# Patient Record
Sex: MALE | Race: BLACK OR AFRICAN AMERICAN | Employment: UNEMPLOYED | URBAN - METROPOLITAN AREA
[De-identification: names, ages, dates, MRNs, and addresses within clinical notes are randomized per-mention and may not be internally consistent; named-entity substitution may affect disease eponyms.]

---

## 2019-08-07 ENCOUNTER — ANESTHESIA EVENT (OUTPATIENT)
Dept: ENDOSCOPY | Age: 64
End: 2019-08-07
Payer: MEDICAID

## 2019-08-07 RX ORDER — ASPIRIN 81 MG/1
81 TABLET ORAL DAILY
COMMUNITY

## 2019-08-07 RX ORDER — AMLODIPINE BESYLATE 5 MG/1
5 TABLET ORAL DAILY
COMMUNITY

## 2019-08-07 NOTE — PERIOP NOTES
PAT - SURGICAL PRE-ADMISSION INSTRUCTIONS    NAME:  Sajan Mcnulty                                                          TODAY'S DATE:  8/7/2019    SURGERY DATE:  8/8/2019                                  SURGERY ARRIVAL TIME:   1300    1. Do NOT eat or drink anything, including candy or gum, after MIDNIGHT on 8/7/19 , unless you have specific instructions from your Surgeon or Anesthesia Provider to do so. 2. No smoking on the day of surgery. 3. No alcohol 24 hours prior to the day of surgery. 4. No recreational drugs for one week prior to the day of surgery. 5. Leave all valuables, including money/purse, at home. 6. Remove all jewelry, nail polish, makeup (including mascara); no lotions, powders, deodorant, or perfume/cologne/after shave. 7. Glasses/Contact lenses and Dentures may be worn to the hospital.  They will be removed prior to surgery. 8. Call your doctor if symptoms of a cold or illness develop within 24 ours prior to surgery. 9. AN ADULT MUST DRIVE YOU HOME AFTER OUTPATIENT SURGERY. 10. If you are having an OUTPATIENT procedure, please make arrangements for a responsible adult to be with you for 24 hours after your surgery. 11. If you are admitted to the hospital, you will be assigned to a bed after surgery is complete. Normally a family member will not be able to see you until you are in your assigned bed. 15. Family is encouraged to accompany you to the hospital.  We ask visitors in the treatment area to be limited to ONE person at a time to ensure patient privacy. EXCEPTIONS WILL BE MADE AS NEEDED. 15. Children under 12 are discouraged from entering the treatment area and need to be supervised by an adult when in the waiting room. Special Instructions:     Take these medications the morning of surgery with a sip of water:  amlodipine, Complete bowel prep per MD instructions., STOP anticoagulants AT LEAST 1 WEEK PRIOR to your surgery or, follow other MD instructions: aspirin    Patient Prep:    shower with anti-bacterial soap    These surgical instructions were reviewed with patient during the PAT phone interview    Directions: On the morning of surgery, please go to the [address] [facility]. Enter the building from the [facility] entrance, 1st floor (next to the Emergency Room entrance). Take the elevator to the 2nd floor. Sign in at the Registration Desk.     If you have any questions and/or concerns, please do not hesitate to call:  (Prior to the day of surgery)  [facility] unit:  [phone]  (Day of surgery)  [facility] unit:  [phone]

## 2019-08-08 ENCOUNTER — HOSPITAL ENCOUNTER (OUTPATIENT)
Age: 64
Setting detail: OUTPATIENT SURGERY
Discharge: HOME | End: 2019-08-08
Attending: INTERNAL MEDICINE | Admitting: INTERNAL MEDICINE
Payer: MEDICAID

## 2019-08-08 ENCOUNTER — ANESTHESIA (OUTPATIENT)
Dept: ENDOSCOPY | Age: 64
End: 2019-08-08
Payer: MEDICAID

## 2019-08-08 VITALS
SYSTOLIC BLOOD PRESSURE: 180 MMHG | HEIGHT: 73 IN | RESPIRATION RATE: 14 BRPM | HEART RATE: 61 BPM | BODY MASS INDEX: 24.05 KG/M2 | WEIGHT: 181.44 LBS | TEMPERATURE: 98 F | OXYGEN SATURATION: 100 % | DIASTOLIC BLOOD PRESSURE: 86 MMHG

## 2019-08-08 PROBLEM — Z86.010 ENCOUNTER FOR COLONOSCOPY DUE TO HISTORY OF ADENOMATOUS COLONIC POLYPS: Status: ACTIVE | Noted: 2019-08-08

## 2019-08-08 PROBLEM — Z12.11 ENCOUNTER FOR COLONOSCOPY DUE TO HISTORY OF ADENOMATOUS COLONIC POLYPS: Status: ACTIVE | Noted: 2019-08-08

## 2019-08-08 PROCEDURE — 88305 TISSUE EXAM BY PATHOLOGIST: CPT

## 2019-08-08 PROCEDURE — 77030038604 HC SNR ENDO EXACTO USEN -B: Performed by: INTERNAL MEDICINE

## 2019-08-08 PROCEDURE — 74011250636 HC RX REV CODE- 250/636

## 2019-08-08 PROCEDURE — 74011250636 HC RX REV CODE- 250/636: Performed by: NURSE ANESTHETIST, CERTIFIED REGISTERED

## 2019-08-08 PROCEDURE — 77030021593 HC FCPS BIOP ENDOSC BSC -A: Performed by: INTERNAL MEDICINE

## 2019-08-08 PROCEDURE — 76060000032 HC ANESTHESIA 0.5 TO 1 HR: Performed by: INTERNAL MEDICINE

## 2019-08-08 PROCEDURE — 76040000007: Performed by: INTERNAL MEDICINE

## 2019-08-08 RX ORDER — SODIUM CHLORIDE 0.9 % (FLUSH) 0.9 %
5-40 SYRINGE (ML) INJECTION AS NEEDED
Status: DISCONTINUED | OUTPATIENT
Start: 2019-08-08 | End: 2019-08-08 | Stop reason: HOSPADM

## 2019-08-08 RX ORDER — LIDOCAINE HYDROCHLORIDE 10 MG/ML
0.1 INJECTION, SOLUTION EPIDURAL; INFILTRATION; INTRACAUDAL; PERINEURAL AS NEEDED
Status: DISCONTINUED | OUTPATIENT
Start: 2019-08-08 | End: 2019-08-08 | Stop reason: HOSPADM

## 2019-08-08 RX ORDER — SODIUM CHLORIDE, SODIUM LACTATE, POTASSIUM CHLORIDE, CALCIUM CHLORIDE 600; 310; 30; 20 MG/100ML; MG/100ML; MG/100ML; MG/100ML
25 INJECTION, SOLUTION INTRAVENOUS CONTINUOUS
Status: DISCONTINUED | OUTPATIENT
Start: 2019-08-08 | End: 2019-08-08 | Stop reason: HOSPADM

## 2019-08-08 RX ORDER — SODIUM CHLORIDE 0.9 % (FLUSH) 0.9 %
5-40 SYRINGE (ML) INJECTION EVERY 8 HOURS
Status: DISCONTINUED | OUTPATIENT
Start: 2019-08-08 | End: 2019-08-08 | Stop reason: HOSPADM

## 2019-08-08 RX ORDER — PROPOFOL 10 MG/ML
INJECTION, EMULSION INTRAVENOUS AS NEEDED
Status: DISCONTINUED | OUTPATIENT
Start: 2019-08-08 | End: 2019-08-08 | Stop reason: HOSPADM

## 2019-08-08 RX ORDER — SODIUM CHLORIDE 9 MG/ML
25 INJECTION, SOLUTION INTRAVENOUS CONTINUOUS
Status: DISCONTINUED | OUTPATIENT
Start: 2019-08-08 | End: 2019-08-08 | Stop reason: HOSPADM

## 2019-08-08 RX ORDER — DEXTROMETHORPHAN/PSEUDOEPHED 2.5-7.5/.8
1.2 DROPS ORAL
Status: DISCONTINUED | OUTPATIENT
Start: 2019-08-08 | End: 2019-08-08 | Stop reason: HOSPADM

## 2019-08-08 RX ORDER — LIDOCAINE HYDROCHLORIDE 20 MG/ML
INJECTION, SOLUTION EPIDURAL; INFILTRATION; INTRACAUDAL; PERINEURAL AS NEEDED
Status: DISCONTINUED | OUTPATIENT
Start: 2019-08-08 | End: 2019-08-08 | Stop reason: HOSPADM

## 2019-08-08 RX ADMIN — PROPOFOL 10 MG: 10 INJECTION, EMULSION INTRAVENOUS at 15:48

## 2019-08-08 RX ADMIN — PROPOFOL 10 MG: 10 INJECTION, EMULSION INTRAVENOUS at 15:38

## 2019-08-08 RX ADMIN — PROPOFOL 20 MG: 10 INJECTION, EMULSION INTRAVENOUS at 15:29

## 2019-08-08 RX ADMIN — SODIUM CHLORIDE, SODIUM LACTATE, POTASSIUM CHLORIDE, AND CALCIUM CHLORIDE 25 ML/HR: 600; 310; 30; 20 INJECTION, SOLUTION INTRAVENOUS at 13:54

## 2019-08-08 RX ADMIN — PROPOFOL 10 MG: 10 INJECTION, EMULSION INTRAVENOUS at 16:00

## 2019-08-08 RX ADMIN — PROPOFOL 10 MG: 10 INJECTION, EMULSION INTRAVENOUS at 15:57

## 2019-08-08 RX ADMIN — PROPOFOL 30 MG: 10 INJECTION, EMULSION INTRAVENOUS at 15:41

## 2019-08-08 RX ADMIN — PROPOFOL 10 MG: 10 INJECTION, EMULSION INTRAVENOUS at 15:55

## 2019-08-08 RX ADMIN — PROPOFOL 10 MG: 10 INJECTION, EMULSION INTRAVENOUS at 16:02

## 2019-08-08 RX ADMIN — PROPOFOL 10 MG: 10 INJECTION, EMULSION INTRAVENOUS at 15:39

## 2019-08-08 RX ADMIN — PROPOFOL 10 MG: 10 INJECTION, EMULSION INTRAVENOUS at 15:35

## 2019-08-08 RX ADMIN — PROPOFOL 20 MG: 10 INJECTION, EMULSION INTRAVENOUS at 15:28

## 2019-08-08 RX ADMIN — PROPOFOL 10 MG: 10 INJECTION, EMULSION INTRAVENOUS at 15:53

## 2019-08-08 RX ADMIN — PROPOFOL 20 MG: 10 INJECTION, EMULSION INTRAVENOUS at 15:40

## 2019-08-08 RX ADMIN — PROPOFOL 10 MG: 10 INJECTION, EMULSION INTRAVENOUS at 15:34

## 2019-08-08 RX ADMIN — PROPOFOL 10 MG: 10 INJECTION, EMULSION INTRAVENOUS at 15:59

## 2019-08-08 RX ADMIN — PROPOFOL 10 MG: 10 INJECTION, EMULSION INTRAVENOUS at 15:30

## 2019-08-08 RX ADMIN — PROPOFOL 40 MG: 10 INJECTION, EMULSION INTRAVENOUS at 15:27

## 2019-08-08 RX ADMIN — PROPOFOL 10 MG: 10 INJECTION, EMULSION INTRAVENOUS at 15:32

## 2019-08-08 RX ADMIN — PROPOFOL 10 MG: 10 INJECTION, EMULSION INTRAVENOUS at 15:44

## 2019-08-08 RX ADMIN — PROPOFOL 10 MG: 10 INJECTION, EMULSION INTRAVENOUS at 15:37

## 2019-08-08 RX ADMIN — PROPOFOL 10 MG: 10 INJECTION, EMULSION INTRAVENOUS at 15:51

## 2019-08-08 RX ADMIN — LIDOCAINE HYDROCHLORIDE 50 MG: 20 INJECTION, SOLUTION EPIDURAL; INFILTRATION; INTRACAUDAL; PERINEURAL at 15:27

## 2019-08-08 RX ADMIN — PROPOFOL 10 MG: 10 INJECTION, EMULSION INTRAVENOUS at 16:03

## 2019-08-08 NOTE — PERIOP NOTES
Pre-Op Summary    Pt arrived via car with family/friend and is oriented to time, place, person and situation. Patient with steady gait with none assistive devices. Visit Vitals  BP (P) 177/90   Pulse (P) 63   Temp (P) 98 °F (36.7 °C)   Resp (P) 18   Ht (P) 6' 1\" (1.854 m)   Wt (P) 82.3 kg (181 lb 7 oz)   SpO2 (P) 100%   BMI (P) 23.94 kg/m²       Peripheral IV located on Right hand . Patients belongings are located with patient. Patient's point of contact is friend Aravind Hernandez and their contact number is: [phone]. They will be in the waiting room. They are not able to receive medication information. They will be their ride home. English

## 2019-08-08 NOTE — PERIOP NOTES
Phase 2 Recovery Summary  Patient arrived to Phase 2 at 1611  Report received from Fide Juan, Jermanad:    08/07/19 1005 08/08/19 1337 08/08/19 1612   BP:  177/90 180/86   Pulse:  63 61   Resp:  18 14   Temp:  98 °F (36.7 °C)    SpO2:  100% 100%   Weight: 79.4 kg (175 lb) 82.3 kg (181 lb 7 oz)    Height: 6' 1\" (1.854 m) 6' 1\" (1.854 m)        oriented to time, place, person and situation    Lines and Drains  Peripheral Intravenous Line: Removed prior to discharge    Peripheral IV 08/08/19 Right Hand (Active)   Site Assessment Clean, dry, & intact 8/8/2019  1:29 PM   Phlebitis Assessment 0 8/8/2019  1:29 PM   Infiltration Assessment 0 8/8/2019  1:29 PM   Dressing Status Clean, dry, & intact 8/8/2019  1:29 PM   Dressing Type Tape;Transparent 8/8/2019  1:29 PM   Hub Color/Line Status Pink; Infusing 8/8/2019  1:29 PM         Patient discharged to home with friend  at [address] [place] [address] , RN

## 2019-08-08 NOTE — ANESTHESIA PREPROCEDURE EVALUATION
Relevant Problems   No relevant active problems       Anesthetic History   No history of anesthetic complications            Review of Systems / Medical History  Patient summary reviewed, nursing notes reviewed and pertinent labs reviewed    Pulmonary          Smoker         Neuro/Psych   Within defined limits           Cardiovascular    Hypertension: well controlled              Exercise tolerance: >4 METS     GI/Hepatic/Renal  Within defined limits              Endo/Other  Within defined limits           Other Findings            Physical Exam    Airway  Mallampati: II  TM Distance: 4 - 6 cm  Neck ROM: normal range of motion        Cardiovascular  Regular rate and rhythm,  S1 and S2 normal,  no murmur, click, rub, or gallop  Rhythm: regular  Rate: normal         Dental    Dentition: Poor dentition     Pulmonary  Breath sounds clear to auscultation               Abdominal  Abdominal exam normal       Other Findings            Anesthetic Plan    ASA: 3  Anesthesia type: MAC          Induction: Intravenous  Anesthetic plan and risks discussed with: Patient

## 2019-08-08 NOTE — DISCHARGE INSTRUCTIONS
Patient Education   Patient armband removed and given to patient to take home. Patient was informed of the privacy risks if armband lost or stolen  RECOMMENDATIONS:  1. Call me in 2-3 weeks for polyp biopsy results if not received beforehand. Next colonoscopy in 3 years b/o suboptimal prep and adenomas in the past     Colonoscopy: What to Expect at [address] [place] [address]  After you have a colonoscopy, you will stay at the clinic for 1 to 2 hours until the medicines wear off. Then you can go home. But you will need to arrange for a ride. Your doctor will tell you when you can eat and do your other usual activities. Your doctor will talk to you about when you will need your next colonoscopy. Your doctor can help you decide how often you need to be checked. This will depend on the results of your test and your risk for colorectal cancer. After the test, you may be bloated or have gas pains. You may need to pass gas. If a biopsy was done or a polyp was removed, you may have streaks of blood in your stool (feces) for a few days. Problems such as heavy rectal bleeding may not occur until several weeks after the test. This isn't common. But it can happen after polyps are removed. This care sheet gives you a general idea about how long it will take for you to recover. But each person recovers at a different pace. Follow the steps below to get better as quickly as possible. How can you care for yourself at home? Activity    · Rest when you feel tired.     · You can do your normal activities when it feels okay to do so. Diet    · Follow your doctor's directions for eating.     · Unless your doctor has told you not to, drink plenty of fluids. This helps to replace the fluids that were lost during the colon prep.     · Do not drink alcohol. Medicines    · Your doctor will tell you if and when you can restart your medicines.  He or she will also give you instructions about taking any new medicines.     · If you take blood thinners, such as warfarin (Coumadin), clopidogrel (Plavix), or aspirin, be sure to talk to your doctor. He or she will tell you if and when to start taking those medicines again. Make sure that you understand exactly what your doctor wants you to do.     · If polyps were removed or a biopsy was done during the test, your doctor may tell you not to take aspirin or other anti-inflammatory medicines for a few days. These include ibuprofen (Advil, Motrin) and naproxen (Aleve). Other instructions    · For your safety, do not drive or operate machinery until the medicine wears off and you can think clearly. Your doctor may tell you not to drive or operate machinery until the day after your test.     · Do not sign legal documents or make major decisions until the medicine wears off and you can think clearly. The anesthesia can make it hard for you to fully understand what you are agreeing to. Follow-up care is a key part of your treatment and safety. Be sure to make and go to all appointments, and call your doctor if you are having problems. It's also a good idea to know your test results and keep a list of the medicines you take. When should you call for help? Call 911 anytime you think you may need emergency care. For example, call if:    · You passed out (lost consciousness).     · You pass maroon or bloody stools.     · You have trouble breathing.    Call your doctor now or seek immediate medical care if:    · You have pain that does not get better after you take pain medicine.     · You are sick to your stomach or cannot drink fluids.     · You have new or worse belly pain.     · You have blood in your stools.     · You have a fever.     · You cannot pass stools or gas.    Watch closely for changes in your health, and be sure to contact your doctor if you have any problems. Where can you learn more? Go to http://eldon-ingrid.info/.   Enter E264 in the search box to learn more about \"Colonoscopy: What to Expect at Home. \"  Current as of: December 19, 2018  Content Version: 12.1  © 8228-1554 [employer]wise, Incorporated. Care instructions adapted under license by [employer] (which disclaims liability or warranty for this information). If you have questions about a medical condition or this instruction, always ask your healthcare professional. [address] any warranty or liability for your use of this information.

## 2019-08-08 NOTE — ANESTHESIA POSTPROCEDURE EVALUATION
Procedure(s):  COLONOSCOPY.     MAC    Anesthesia Post Evaluation      Multimodal analgesia: multimodal analgesia not used between 6 hours prior to anesthesia start to PACU discharge  Patient location during evaluation: bedside  Patient participation: complete - patient participated  Level of consciousness: sleepy but conscious and responsive to verbal stimuli  Pain score: 0  Airway patency: patent  Anesthetic complications: no  Cardiovascular status: acceptable  Respiratory status: acceptable and room air  Hydration status: acceptable  Post anesthesia nausea and vomiting:  none      Vitals Value Taken Time   /86 8/8/2019  4:12 PM   Temp     Pulse 61 8/8/2019  4:12 PM   Resp 14 8/8/2019  4:12 PM   SpO2 100 % 8/8/2019  4:12 PM

## 2019-08-08 NOTE — PROCEDURES
Colonoscopy Report    Patient: Joi Levin MRN: [id number]  SSN: [id number]    YOB: 1955  Age: 60 y.o. Sex: male      Date of Procedure: 8/8/2019    IMPRESSION:  1.  suboptimal prep with puddles of opaque colonic effluent throughout the colon requiring vigorous lavage and suctioning to achieve an adequate prep  2. Normal terminal ileum for 5 cm  3.  3 polyps removed with cold bx---3 mm polyps at 50 cm, 45 cm, 30 cm  4.  15 polyps removed with cold snare---7-8 mm semipedunculated at IC valve, 6-7 mm sessile at mid-transverse colon and 13 in rectosigmoid area  5. Mild diverticulosis sigmoid and distal left colon  6. Small hemorrhoids in the anus    RECOMMENDATIONS:  1. Call me in 2-3 weeks for polyp biopsy results if not received beforehand. Next colonoscopy in 3 years b/o suboptimal prep and adenomas in the past    Indication:  5 year surveillance for adenomas  History: The history and physical exam were reviewed and updated. Procedure Performed: Colonoscopy biopsy, polypectomy (cold snare)  Endoscopist: Dominic Reyes MD  Assistant: Endoscopy Technician-1: Nita Waite  Endoscopy RN-1: Conner Lim RN   ASA: ASA 2 - Patient with mild systemic disease with no functional limitations  Mallampati Score: II (soft palate, uvula, fauces visible)  Sedation:  MAC anesthesia  Endoscope: [id number]  Extent of Examination:terminal ileum  Quality of Preparation: adequate    Technique: The procedure was discussed with the patient including risks, benefits, alternatives including risks of IV sedation, bleeding, perforation and missed polyp. A safety timeout was performed. The patient was placed in left lateral position. The patient was given incremental doses of IV medication to achieve moderate  sedation. The patients vital signs were monitored at all times including heart rate, rhythm, blood pressure and oxygen saturation.   When adequate sedation was achieved a perianal inspection and a digital rectal exam were performed. The video colonoscope was introduced into the rectum and advanced under direct vision up to the cecum and 5 cm into the terminal ileum. The cecum was identified by IC valve, appendiceal orifice and convergence of the tineal folds. Careful examination of the colonic mucosa was then performed on slow withdrawal of the endoscope. Retroflexion was performed in the rectum and the distal rectum visualized as was the anal canal.  The patient tolerated the procedure very well and was transferred to recovery area. Findings:  See impression above  EBL: minimal  Specimen:   ID Type Source Tests Collected by Time Destination   1 : (cold snare) polyp  Preservative Colon, Ileocecal valve  Tara Beauchamp MD 8/8/2019 1548 Pathology   2 : (cold snare) polyp mid transverse colon  Preservative Colon, Transverse  Kati Otto MD 8/8/2019 1549 Pathology   3 : bx polyp @ 50 cm  Preservative Colon  Kati Otto MD 8/8/2019 1549 Pathology   4 : Bx polyp @ 45 cm  Preservative Colon  Kati Otto MD 8/8/2019 1554 Pathology   5 : bx polyp @ 30 cm  Preservative Colon  Tara Beauchamp MD 8/8/2019 1600 Pathology   6 : (cold snare) polyps x 13 Preservative Colon, Recto-sigmoid  Tara Beauchamp MD 8/8/2019 1601 Pathology       [address] [facility]  MD Nito, Cy Bolus, [facility]  August 8, 2019  4:09 PM

## 2019-08-08 NOTE — H&P
Reason for Appointment   1. NP Consult   2. Hepatitis C      History of Present Illness   General:   The patient was seen in the office June 2013 to arrange colon cancer screening. His family history was negative. He had no active GI symptoms to report other than constipation. CT scan had shown hemangioma in the liver and no further evaluation was advised. The patient had colonoscopy July 2013 performed by me. He had a 1.8-2 cm sessile polyp originating from the cecal side of the inferior aspect will cecal valve removed with hot snare. A small 3 mm polyp at 37 is removed with a cold biopsy. He also had hemorrhoids. Also both sessile serrated adenomatous hyperplastic. A follow-up colonoscopy in 3-6 months was advised because of piecemeal removal of a large cecal polyp. The patient did not return for that short interval follow-up colonoscopy exam.  May 28, 2019: The patient is sent to me by Gokul Perez NP because of hepatitis C antibody positive. The patient reports being told about hepatitis C many years ago. He reports being treated with medication for hepatitis C through the [facility] at [facility] a year or so ago. He does not know the name of the medication, but does indicate he took the medication as prescribed for the entire duration. He did not go back to the clinic however, for monitoring to see if medication with successful in eradicating his viral infection. He has been a fairly regular drinker of alcohol beverages in the past. He cut back drinking severely when he was treated for hepatitis C in 2017 or so. He has been drinking on an occasional basis since then. He has a history chronic constipation related to tramadol use. He uses a stool softner once a day with fairly good control. He has no chronic recent abdominal pain, nausea, vomiting, indigestion, heartburn. His appetite is good. His weight is stable.      Current Medications   Taking    tramadol 50 mg tablet 1 tab(s) orally every 4 hours Flexeril 5 mg tablet 1 tab(s) orally 3 times a day    amlodipine 5 mg tablet 1 tab(s) orally once a day    Aspir-Low 81 mg delayed release tablet 1 tab(s) orally once a day    chlorthalidone 25 mg tablet 1 tab(s) orally once a day    fluid pill ? po    Medication List reviewed and reconciled with the patient      Past Medical History   Shoulder pain. Knee fracture. .52 stitches. Arthritis. A hemangioma measuring 3.3 x 2.7 cm is noted in the right lobe of liver on CT scan . Hypertension. Surgical History   Knee surgery    Colonoscopy      Family History   Father:    Mother:    No known family history of any chronic GI illness including no esophageal, gastric or colon cancer. Social History   Occupation: Retired, Disabled (on disability). no Drugs (Illicit). Smoking   Tobacco use: current smoker 1 pack per day  Patient uses other tobacco products: No  Patient counselled on the dangers of tobacco use and urged to quit: 2019  e-Cigarettes No  no Tattoos. Alcohol: yes, Drinks 10.5 oz a week. no IV Drug use. no Blood transfusions, The patient has never received a blood transfusion. Marital Status: Single. Allergies   N.K.D.A. Review of Systems   Complete review of systems was taken and reviewed with the patient on 19 and will be scanned into the medical record. Positives will be noted in HPI. Negatives will not be listed here. Vital Signs   BMI 23.61, HR 68, /83, Wt 179, Ht 6'1\", RR 16.     Examination   General examination:  LABORATORY DATA: Lab work 2019: WBC 6.7, hemoglobin 12.4, hematocrit 37.3, MCV 94, platelets 656. BMP normal. Hepatic function profile normal except for alkaline phosphatase is 121/117. Total protein is 7.7 with albumin of 4.2, total bili less than 0.2, AST 10, ALT 9. Hepatitis B cirrhosis antigen reactive. Hepatitis C antibody positive. RPR reactive 1:1 dilution.  Lab work 2017: Iron 72, TBC to 62, percent saturation 27%, ferritin elevated 888, hepatitis C genotype 1A. Lab work July 2018: WBC 7.4, hemoglobin 11.8, hematocrit 35.4, MCV elevated at 99, platelet count 736. A BMP was normal. Alcohol level elevated 0.8. Urinalysis opiates detected. Otherwise dipstick negative. Physical Examination   General: Pleasant, somewhat poorly appearing male in no obvious distress. Neck: Supple. No thyromegaly or mass palpable. Nodes: No supraclavicular, axillary, cervical nodes palpable. Chest: Clear to auscultation symmetric breath sounds. Good air movement. Heart: First and second heart sounds are normal. No murmurs, rubs, gallops. Abdomen: Soft, nontender. No organomegaly or mass palpable. Bowel sounds are normal there no bruits. Extremities: No pedal edema. Assessments     1. Chronic hepatitis C without hepatic coma - B18.2 (Primary), The patient reports being diagnosed with hepatitis C many years ago. He is not certain whether he had been diagnosed with hepatitis C prior to his colonoscopy the treatment was successful. He is being sent to me now by his new primary physician, Eloy Rothman NP. For whatever reason, that was not communicated to me back in 2013. He reports having recently been treated for hepatitis C by a physician at [facility] clinic. He has completed treatment 5 months ago but did not return to get the results as to whether or not the treatment was successful. He is sent to me now by his new PCP Eloy Rothman NP to determine whether his hepatitis C has been successfully eradicated or whether additional treatment is necessary. At this time, I do not have the previous records for my review from the outpatient clinic at [facility]. 2. Hemangioma of other sites - D18.09, Liver imaging, identified on CT scan of the chest April 2013. No further evaluation, treatment monitoring was recommended at that time. No more recent imaging results available to me today.  No symptoms referable to right upper quadrant on a historical basis. 3. Constipation, unspecified - K59.00, Chronic constipation at baseline. Most likely worsened by his regular use of tramadol. He was taking tramadol also when I saw him in June 2013. He is taking a stool softener daily with good control by his history today. 4. Personal history of colonic polyps - Z86.010, Large broad-based polyp in the cecal base removed in piece fashion back in 2013. A short interval follow-up examination recommended for later that year. The patient is 6 years behind on returning for that short interval follow-up exam to make sure that the large lesion in the cecum was removed completely in 2013. He acknowledges knowing that a short interval colonoscopy was recommended after the colonoscopy in 2013 but provides no explanation as to why he did not return as advised. 5. Anemia, unspecified - D64.9, No evidence of azotemia. His blood work to rule out iron deficiency, A62, folic acid deficiency, hemolysis, primary bone marrow disorder. He will stop drinking alcohol to eliminate this as a possible cause of his mild normocytic anemia. Treatment   1. Chronic hepatitis C without hepatic coma   LAB: alpha feto protein  LAB: Hepatitis C RNA Quant  LAB: Fibrosure - HCV  IMAGING: Ultrasound of the liver     Carlee Arellano 05/28/2019 02:46:49 PM > Please call pt to schedule. He can be reached @ [phone]. Please schedule @ [facility]. TY     Notes: 1. My medical records staff will try to get all liver related laboratory studies, imaging from the [place] [facility] system. The patient reports being treated for hepatitis C through the outpatient clinic at [facility]. 2. In the meantime, I will order hepatitis C viral RNA by PCR, alpha-fetoprotein, hepatic function profile, fibrosure hep C so that I can make recommendations as to how best to proceed. 3. The patient should call me next week for results of the blood work and further suggestions.  4. He should stop drinking alcoholic beverages completely included beer, wine, etc. 5. U/S liver, call me 2 days later for results. 2. Constipation, unspecified   Notes: 1. OK to continue stool softeners. 3. Personal history of colonic polyps   Start PEG - 3350 * with electrolytes powder for reconsitution, 4000 ml, as directed, orally, as directed, 2 days, 1 gallon bottle, Refills 0  Start Reglan for prep tablet, 5 mg, 1 tab(s), orally, 1 dose, 2 days, 2, Refills 0  Start Citrate of Magnesia liquid, 1.745 g/30 mL, 300 mL, orally, once, 3 dose(s), 3 bottles, Refills 0  IMAGING: Colonoscopy with MAC ([facility]) (Ordered for 07/05/2019)  Notes: 1. Needs colonoscopy to r/o residual polyp from the large sessile polyp removed from the cecum is piecemeal fashion in 2013. Also need to rule out new polyps. Low residue diet for 1 week prior to procedure. PEG 3350/Reglan prep. July 5 9 AM [facility]. Citrate of magnesia 1 bottle on July 1, July 2, July 3. 2. Further plans depending on colonoscopy findings. 4. Anemia, unspecified   LAB: CBC W/DIFF  LAB: ferritin  LAB: B12 level  LAB: Iron, Serum  LAB: TIBC  LAB: RBC Folate  LAB: Serum Protein Electrophoresis, Immunofixation, and Free Light Chains  Notes: 1. Avoid drinking beer and other alcoholic beverages. 2. Labs today to include iron studies, P27 level, RBC folic acid level, reticular count. 3. He should call me later this week for results of blood work and further suggestions. 4. In terms of the positive RPR, he is been [facility] the [facility]. I have reinforced the importance of this follow-up.              NO SIGNIFICANT INTERVAL CHANGE IN HISTORY OR PHYSICAL FINDINGS SINCE LAST OFFICE VISIT

## 2019-09-19 PROBLEM — Z86.010 ENCOUNTER FOR COLONOSCOPY DUE TO HISTORY OF ADENOMATOUS COLONIC POLYPS: Status: RESOLVED | Noted: 2019-08-08 | Resolved: 2019-09-19

## 2019-09-19 PROBLEM — Z12.11 ENCOUNTER FOR COLONOSCOPY DUE TO HISTORY OF ADENOMATOUS COLONIC POLYPS: Status: RESOLVED | Noted: 2019-08-08 | Resolved: 2019-09-19

## 2020-06-23 ENCOUNTER — VIRTUAL VISIT (OUTPATIENT)
Dept: CARDIOLOGY CLINIC | Age: 65
End: 2020-06-23

## 2020-06-23 DIAGNOSIS — I70.219 ATHEROSCLEROSIS OF NATIVE ARTERY OF LOWER EXTREMITY WITH INTERMITTENT CLAUDICATION, UNSPECIFIED LATERALITY (HCC): Primary | ICD-10-CM

## 2020-07-07 ENCOUNTER — OFFICE VISIT (OUTPATIENT)
Dept: CARDIOLOGY CLINIC | Age: 65
End: 2020-07-07

## 2020-07-07 VITALS
TEMPERATURE: 98.4 F | SYSTOLIC BLOOD PRESSURE: 150 MMHG | HEART RATE: 75 BPM | DIASTOLIC BLOOD PRESSURE: 78 MMHG | WEIGHT: 188.4 LBS | OXYGEN SATURATION: 97 % | BODY MASS INDEX: 24.86 KG/M2

## 2020-07-07 DIAGNOSIS — F17.200 CURRENT SMOKER: ICD-10-CM

## 2020-07-07 DIAGNOSIS — I70.219 ATHEROSCLEROSIS OF NATIVE ARTERY OF LOWER EXTREMITY WITH INTERMITTENT CLAUDICATION, UNSPECIFIED LATERALITY (HCC): Primary | ICD-10-CM
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
## 2020-07-30 ENCOUNTER — HOSPITAL ENCOUNTER (OUTPATIENT)
Age: 65
Setting detail: OUTPATIENT SURGERY
Discharge: HOME | End: 2020-07-30
Attending: SURGERY | Admitting: SURGERY
Payer: MEDICARE

## 2020-07-30 VITALS
WEIGHT: 195 LBS | DIASTOLIC BLOOD PRESSURE: 66 MMHG | HEART RATE: 80 BPM | HEIGHT: 72 IN | OXYGEN SATURATION: 99 % | BODY MASS INDEX: 26.41 KG/M2 | SYSTOLIC BLOOD PRESSURE: 127 MMHG

## 2020-07-30 DIAGNOSIS — I70.211 ATHEROSCLEROSIS OF NATIVE ARTERY OF RIGHT LOWER EXTREMITY WITH INTERMITTENT CLAUDICATION (HCC): ICD-10-CM

## 2020-07-30 LAB
BUN BLD-MCNC: 13 MG/DL (ref 7–18)
CHLORIDE BLD-SCNC: 101 MMOL/L (ref 100–108)
CREAT UR-MCNC: 1.2 MG/DL (ref 0.6–1.3)
GLUCOSE BLD STRIP.AUTO-MCNC: 100 MG/DL (ref 74–106)
HCT VFR BLD CALC: 39 % (ref 36–49)
HGB BLD-MCNC: 13.3 G/DL (ref 12–16)
POTASSIUM BLD-SCNC: 3.5 MMOL/L (ref 3.5–5.5)
SODIUM BLD-SCNC: 139 MMOL/L (ref 136–145)

## 2020-07-30 PROCEDURE — 82947 ASSAY GLUCOSE BLOOD QUANT: CPT

## 2020-07-30 PROCEDURE — C1887 CATHETER, GUIDING: HCPCS | Performed by: SURGERY

## 2020-07-30 PROCEDURE — 74011250637 HC RX REV CODE- 250/637: Performed by: SURGERY

## 2020-07-30 PROCEDURE — 77030013519 HC DEV INFL BASIX MRTM -B: Performed by: SURGERY

## 2020-07-30 PROCEDURE — 99153 MOD SED SAME PHYS/QHP EA: CPT | Performed by: SURGERY

## 2020-07-30 PROCEDURE — C1760 CLOSURE DEV, VASC: HCPCS | Performed by: SURGERY

## 2020-07-30 PROCEDURE — 99152 MOD SED SAME PHYS/QHP 5/>YRS: CPT | Performed by: SURGERY

## 2020-07-30 PROCEDURE — 75625 CONTRAST EXAM ABDOMINL AORTA: CPT | Performed by: SURGERY

## 2020-07-30 PROCEDURE — C1894 INTRO/SHEATH, NON-LASER: HCPCS | Performed by: SURGERY

## 2020-07-30 PROCEDURE — C1769 GUIDE WIRE: HCPCS | Performed by: SURGERY

## 2020-07-30 PROCEDURE — 76937 US GUIDE VASCULAR ACCESS: CPT | Performed by: SURGERY

## 2020-07-30 PROCEDURE — C1725 CATH, TRANSLUMIN NON-LASER: HCPCS | Performed by: SURGERY

## 2020-07-30 PROCEDURE — 74011000250 HC RX REV CODE- 250: Performed by: SURGERY

## 2020-07-30 PROCEDURE — 77030004530 HC CATH ANGI DX IMGR BSC -A: Performed by: SURGERY

## 2020-07-30 PROCEDURE — C1724 CATH, TRANS ATHEREC,ROTATION: HCPCS | Performed by: SURGERY

## 2020-07-30 PROCEDURE — 74011636320 HC RX REV CODE- 636/320: Performed by: SURGERY

## 2020-07-30 PROCEDURE — 77030012468 HC VLV BLEEDBK CNTRL ABBT -B: Performed by: SURGERY

## 2020-07-30 PROCEDURE — 75710 ARTERY X-RAYS ARM/LEG: CPT | Performed by: SURGERY

## 2020-07-30 PROCEDURE — 37229 HC ARTHERC TIB/PER UNI +/-PTA INIT: CPT | Performed by: SURGERY

## 2020-07-30 PROCEDURE — 74011250636 HC RX REV CODE- 250/636: Performed by: SURGERY

## 2020-07-30 PROCEDURE — 82565 ASSAY OF CREATININE: CPT

## 2020-07-30 PROCEDURE — 77030013744: Performed by: SURGERY

## 2020-07-30 RX ORDER — CLOPIDOGREL BISULFATE 75 MG/1
75 TABLET ORAL DAILY
Qty: 30 TAB | Refills: 3 | Status: SHIPPED | OUTPATIENT
Start: 2020-07-30

## 2020-07-30 RX ORDER — FENTANYL CITRATE 50 UG/ML
INJECTION, SOLUTION INTRAMUSCULAR; INTRAVENOUS AS NEEDED
Status: DISCONTINUED | OUTPATIENT
Start: 2020-07-30 | End: 2020-07-30 | Stop reason: HOSPADM

## 2020-07-30 RX ORDER — MIDAZOLAM HYDROCHLORIDE 1 MG/ML
INJECTION, SOLUTION INTRAMUSCULAR; INTRAVENOUS AS NEEDED
Status: DISCONTINUED | OUTPATIENT
Start: 2020-07-30 | End: 2020-07-30 | Stop reason: HOSPADM

## 2020-07-30 RX ORDER — CLOPIDOGREL 300 MG/1
300 TABLET, FILM COATED ORAL ONCE
Status: COMPLETED | OUTPATIENT
Start: 2020-07-30 | End: 2020-07-30

## 2020-07-30 RX ORDER — LIDOCAINE HYDROCHLORIDE 10 MG/ML
INJECTION, SOLUTION EPIDURAL; INFILTRATION; INTRACAUDAL; PERINEURAL AS NEEDED
Status: DISCONTINUED | OUTPATIENT
Start: 2020-07-30 | End: 2020-07-30 | Stop reason: HOSPADM

## 2020-07-30 RX ORDER — HEPARIN SODIUM 1000 [USP'U]/ML
INJECTION, SOLUTION INTRAVENOUS; SUBCUTANEOUS AS NEEDED
Status: DISCONTINUED | OUTPATIENT
Start: 2020-07-30 | End: 2020-07-30 | Stop reason: HOSPADM

## 2020-07-30 RX ADMIN — CLOPIDOGREL BISULFATE 300 MG: 300 TABLET, FILM COATED ORAL at 15:00

## 2020-07-30 NOTE — Clinical Note
sheath exchanged for [facility] W/TAMARA [id number] -- BRITE TIP - ORDER AS NOY.

## 2020-07-30 NOTE — Clinical Note
Peripheral Lesion 1, atherectomy performed: rotational.   Pass RPM = 120. Duration = 25 sec. Pass #: 3.

## 2020-07-30 NOTE — Clinical Note
Bilateral groin clipped prepped with ChloraPrep and draped. Wet prep solution applied at: 1127. Wet prep solution dried at: 1130. Wet prep elapsed drying time: 3 mins.

## 2020-07-30 NOTE — Clinical Note
TRANSFER - OUT REPORT:     Verbal report given to: Carlos Severance, RN. Report consisted of patient's Situation, Background, Assessment and   Recommendations(SBAR). Opportunity for questions and clarification was provided. Patient transported with a Cardiac Cath Tech / Patient Care Tech. Patient transported to: [address].

## 2020-07-30 NOTE — PROGRESS NOTES
TRANSFER - IN REPORT:    Verbal report received from Erica(name) on [employer]  being received from [facility](unit) for routine post - op      Report consisted of patients Situation, Background, Assessment and   Recommendations(SBAR). Information from the following report(s) SBAR, Procedure Summary, MAR, Recent Results, Quality Measures and Dual Neuro Assessment was reviewed with the receiving nurse. Opportunity for questions and clarification was provided. Assessment completed upon patients arrival to unit and care assumed.          L groin exoseal, patient to receive 300mg of plavix upon return to holding as per Dr. Brielle Zarate

## 2020-07-30 NOTE — DISCHARGE INSTRUCTIONS
DISCHARGE SUMMARY from Nurse    PATIENT INSTRUCTIONS:    After general anesthesia or intravenous sedation, for 24 hours or while taking prescription Narcotics:  · Limit your activities  · Do not drive and operate hazardous machinery  · Do not make important personal or business decisions  · Do  not drink alcoholic beverages  · If you have not urinated within 8 hours after discharge, please contact your surgeon on call. Report the following to your surgeon:  · Excessive pain, swelling, redness or odor of or around the surgical area  · Temperature over 100.5  · Nausea and vomiting lasting longer than 4 hours or if unable to take medications  · Any signs of decreased circulation or nerve impairment to extremity: change in color, persistent  numbness, tingling, coldness or increase pain  · Any questions    What to do at Home:  Recommended activity: Activity as tolerated and no driving for today,       These are general instructions for a healthy lifestyle:    No smoking/ No tobacco products/ Avoid exposure to second hand smoke  Surgeon General's Warning:  Quitting smoking now greatly reduces serious risk to your health. Obesity, smoking, and sedentary lifestyle greatly increases your risk for illness    A healthy diet, regular physical exercise & weight monitoring are important for maintaining a healthy lifestyle    You may be retaining fluid if you have a history of heart failure or if you experience any of the following symptoms:  Weight gain of 3 pounds or more overnight or 5 pounds in a week, increased swelling in our hands or feet or shortness of breath while lying flat in bed. Please call your doctor as soon as you notice any of these symptoms; do not wait until your next office visit. The discharge information has been reviewed with the patient. The patient verbalized understanding.   Discharge medications reviewed with the patient and appropriate educational materials and side effects teaching were provided. ___________________________________________________________________________________________________________________________________HEART CATHETERIZATION/ANGIOGRAPHY DISCHARGE INSTRUCTIONS    1. Check puncture site frequently for swelling or bleeding. If there is any bleeding, lie down and apply pressure over the area with a clean towel or washcloth. Notify your doctor for any redness, swelling, drainage, or oozing from the puncture site. Notify your doctor for any fever or chills. 2. If the extremity becomes cold, numb, or painful call Dr. Dior Cook  3. Activity should be limited for the next 48 hours. Climb stairs as little as possible and avoid any stooping, bending, or strenuous activity for 48 hours. No heavy lifting (anything over 10 pounds) for 3 days. 4. You may resume your usual diet. Drink more fluids than usual.  5. Have a responsible person drive you home and stay with you for at least 24 hours after your heart catheterization/angiography. 6. You may remove bandage from your Left and Groin in 24 hours. You may shower in 24 hours. No tub baths, hot tubs, or swimming for 1 week. Do not place any lotions, creams, powders, or ointments over puncture site for 1 week. You may place a clean band-aid over the puncture site each day for 5 days. Change daily. I have read the above instructions and have had the opportunity to ask questions.       Patient: ________________________   Date: 7/30/2020    Witness: _______________________   Date: 7/30/2020

## 2020-07-30 NOTE — INTERVAL H&P NOTE
Update History & Physical 
 
The Patient's History and Physical of July 07, 2020 was reviewed with the patient and I examined the patient. There was no change. The surgical site was confirmed by the patient and me. Plan:  The risk, benefits, expected outcome, and alternative to the recommended procedure have been discussed with the patient. Patient understands and wants to proceed with the procedure.  
 
Electronically signed by Carlos Pruitt MD on 7/30/2020 at 11:41 AM

## 2020-07-30 NOTE — PROGRESS NOTES
RLE angiogram complete. Atherectomy and PTA of RLE TP trunk and posterior tibial artery with 2.5mm balloon. Vessel hyperreactive with spasm. Injected 40 mcg Nitro. Improved but not completely resolved spasm at completion.

## 2020-07-30 NOTE — Clinical Note
Contrast Dose Calculator:   Patient's age: 63.   Patient's sex: Male. Patient weight (kg) = 88.5. Creatinine level (mg/dL) = 1.2. Creatinine clearance (mL/min): 78.87. Contrast concentration (mg/mL) = 300. MACD = 300 mL. Max Contrast dose per Creatinine Cl calculator = 177.46 mL.

## 2020-07-30 NOTE — Clinical Note
TRANSFER - IN REPORT:     Verbal report received from: Javy Morton RN. Report consisted of patient's Situation, Background, Assessment and   Recommendations(SBAR). Opportunity for questions and clarification was provided. Assessment completed upon patient's arrival to unit and care assumed. Patient transported with a Cardiac Cath Tech / Patient Care Tech.

## 2020-07-30 NOTE — Clinical Note
Peripheral Lesion 1, atherectomy performed: rotational.   Pass RPM = 90. Duration = 26 sec. Pass #: 2.

## 2020-07-30 NOTE — Clinical Note
sheath exchanged for [facility] W/TAMARA [id number] -- BRITE TIP - ORDER AS NOY.

## 2020-07-30 NOTE — Clinical Note
Peripheral Lesion 1, atherectomy performed: rotational.   Pass RPM = 60. Duration = 26 sec. Pass #: 1.

## 2020-07-30 NOTE — Clinical Note
Peripheral Lesion 1. Lesion #1: Pressure = 8 mora; Duration = 120 sec. Lesion #2: Pressure = 8 mora; Duration = 120 sec. Lesion #3: Pressure = 8 mora; Duration = 120 sec.

## 2020-08-01 NOTE — OP NOTES
[address] [place] [address]   OPERATIVE REPORT    Name:  Cecilio Peguero  MR#:   [id number]  :  1955  ACCOUNT #:  [de-identified]  DATE OF SERVICE:  2020    PREOPERATIVE DIAGNOSIS:  Disabling claudication of bilateral lower extremities. POSTOPERATIVE DIAGNOSIS:  Disabling claudication of bilateral lower extremities with tibial artery disease. PROCEDURES PERFORMED:  1. Ultrasound-guided percutaneous access of left common femoral artery. Images of the ultrasound were stored in the patient's chart. 2.  Nonselective catheterization of the abdominal aorta with aortogram with radiologic interpretation. 3.  Second-order catheterization of the right external iliac artery. 4.  Right lower extremity angiogram with radiologic interpretation. 5.  Third-order catheterization of the right superficial femoral artery with radiologic interpretation and diagnostic angiogram.  6.  Orbital atherectomy of the right lower extremity tibioperoneal trunk and posterior tibial artery with CSI device. 7.  Balloon angioplasty of posterior tibial artery and tibioperoneal trunk with 2.5 mm balloon injection of nitroglycerin. SURGEON:  Vamsi Tripathi MD    ASSISTANT:  None. TYPE OF ANESTHESIA:  There was 1 hour 20 minutes of moderate conscious sedation performed by independent provider, given medication per my discretion and monitoring the vital signs throughout the case. COMPLICATIONS:  Left posterior tibial artery vasospasm. SPECIMENS REMOVED:  None. IMPLANTS:  None. ESTIMATED BLOOD LOSS:  Minimal.    INDICATION FOR PROCEDURE:  The patient is a 47-year-old gentleman with bilateral lower extremity claudication, and peripheral arterial disease. Discussion was made to undergo a diagnostic angiogram and possible intervention.   After discussion of the available treatment options, the risks, benefits of procedure, informed consent was obtained for bilateral extremity angiogram and possible intervention. PROCEDURE IN DETAIL:  On 07/30/2020, the patient presented to the catheterization suite, identified by name and ID bracelet by the entire operative team.  Once this was done, the patient was placed on the catheterization table in supine position. After appropriate depth of anesthesia was obtained, the patient was prepped and draped, and a time-out was performed. At this point, using ultrasound, the left common femoral artery was interrogated. It was pulsatile and patent and appeared to be the appropriate site for access with no evidence of any peripheral arterial disease at the access site. There was no evidence of deep venous thrombosis. At this point, 1% lidocaine was used to anesthetize the groin where ultrasound-guided percutaneous access of the common femoral artery was obtained. We advanced a Mandril wire followed by a micro 4-Irish sheath into the femoral artery. We then removed the dilator and wire and advanced a Bentson wire followed a 4-Irish sheath into the femoral artery. We then advanced a flush catheter into the abdominal aorta. We then performed aortogram.  We then crossed the aortic bifurcation and placed our catheter into the right external iliac artery using a Bentson wire and flush catheter. We performed a right lower extremity venogram.  Given the above findings, decision was made to treat. We then advanced our wire to the superficial femoral artery and advanced a 6-Irish sheath across the aortic bifurcation to the right upper thigh. We heparinized the patient appropriately. Then using an 0.018 wire and an 0.018 Trailblazer, we were able to place a wire into the posterior tibial artery. We then advanced our CSI catheter and changed out our wire for a ViperWire and performed an orbital atherectomy at the TP trunk and the posterior tibial artery. We then dilated with a 2.5 mm balloon.   Upon completion, we had improved flow of the TP trunk as well as the popliteal artery, but there was some spasm. There was some evidence of distal stenosis. We then advanced our wire deeper into the foot and then ballooned the distal posterior tibial vessel. Repeat angiogram showed evidence of significant vasospasm, so I injected 400 mcg of nitroglycerin. Repeat angiogram showed improvement. There was still some residual spasm, but no evidence of any occlusion or stenosis. Happy with the results, we then removed the ViperWire and catheter with improved vasospasm. We removed the sheath across the aortic bifurcation and advanced a [employer] wire to abdominal aorta and exchanged for short 6 sheath and then closed our puncture site with a 6-Belizean ExoSeal device. Manual pressure held for hemostasis. At the end of the procedure, I was present and scrubbed throughout the procedure.       Maia Tsang MD      [email or web address]  D:  07/31/2020 15:17  T:  08/01/2020 3:23  JOB #:  [id number]

## 2020-11-11 ENCOUNTER — OFFICE VISIT (OUTPATIENT)
Dept: VASCULAR SURGERY | Age: 65
End: 2020-11-11
Payer: MEDICARE

## 2020-11-11 VITALS
DIASTOLIC BLOOD PRESSURE: 68 MMHG | RESPIRATION RATE: 20 BRPM | SYSTOLIC BLOOD PRESSURE: 122 MMHG | HEART RATE: 64 BPM | OXYGEN SATURATION: 97 %

## 2020-11-11 DIAGNOSIS — I70.213 ATHEROSCLEROSIS OF NATIVE ARTERY OF BOTH LOWER EXTREMITIES WITH INTERMITTENT CLAUDICATION (HCC): Primary | ICD-10-CM

## 2020-11-11 DIAGNOSIS — Z72.0 TOBACCO ABUSE: ICD-10-CM

## 2020-11-11 DIAGNOSIS — I10 ESSENTIAL HYPERTENSION: ICD-10-CM

## 2020-11-11 PROCEDURE — G8427 DOCREV CUR MEDS BY ELIG CLIN: HCPCS | Performed by: PHYSICIAN ASSISTANT

## 2020-11-11 PROCEDURE — 3017F COLORECTAL CA SCREEN DOC REV: CPT | Performed by: PHYSICIAN ASSISTANT

## 2020-11-11 PROCEDURE — G8419 CALC BMI OUT NRM PARAM NOF/U: HCPCS | Performed by: PHYSICIAN ASSISTANT

## 2020-11-11 PROCEDURE — 99214 OFFICE O/P EST MOD 30 MIN: CPT | Performed by: PHYSICIAN ASSISTANT

## 2020-11-11 PROCEDURE — G8432 DEP SCR NOT DOC, RNG: HCPCS | Performed by: PHYSICIAN ASSISTANT
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
## 2020-11-11 NOTE — PROGRESS NOTES
1. Have you been to the ER, urgent care clinic since your last visit? Hospitalized since your last visit? No    2. Have you seen or consulted any other health care providers outside of the [address] [place] [address] since your last visit? Include any pap smears or colon screening.  No              3 most recent PHQ Screens 11/11/2020   Little interest or pleasure in doing things Not at all   Feeling down, depressed, irritable, or hopeless Not at all   Total Score PHQ 2 0

## 2020-12-10 ENCOUNTER — HOSPITAL ENCOUNTER (OUTPATIENT)
Age: 65
Setting detail: OUTPATIENT SURGERY
Discharge: HOME | End: 2020-12-10
Attending: SURGERY | Admitting: SURGERY
Payer: MEDICARE

## 2020-12-10 VITALS
SYSTOLIC BLOOD PRESSURE: 148 MMHG | OXYGEN SATURATION: 100 % | HEIGHT: 73 IN | WEIGHT: 185 LBS | HEART RATE: 64 BPM | BODY MASS INDEX: 24.52 KG/M2 | RESPIRATION RATE: 14 BRPM | DIASTOLIC BLOOD PRESSURE: 73 MMHG

## 2020-12-10 DIAGNOSIS — I70.212 ATHEROSCLER OF NATIVE ARTERY OF LEFT LEG WITH INTERMIT CLAUDICATION (HCC): ICD-10-CM

## 2020-12-10 LAB
ANION GAP SERPL CALC-SCNC: 5 MMOL/L (ref 3–18)
APTT PPP: 32.3 SEC (ref 23–36.4)
BUN SERPL-MCNC: 18 MG/DL (ref 7–18)
BUN/CREAT SERPL: 12 (ref 12–20)
CALCIUM SERPL-MCNC: 9.6 MG/DL (ref 8.5–10.1)
CHLORIDE SERPL-SCNC: 106 MMOL/L (ref 100–111)
CO2 SERPL-SCNC: 28 MMOL/L (ref 21–32)
CREAT SERPL-MCNC: 1.51 MG/DL (ref 0.6–1.3)
ERYTHROCYTE [DISTWIDTH] IN BLOOD BY AUTOMATED COUNT: 13.3 % (ref 11.6–14.5)
GLUCOSE SERPL-MCNC: 93 MG/DL (ref 74–99)
HCT VFR BLD AUTO: 40.8 % (ref 36–48)
HGB BLD-MCNC: 13.3 G/DL (ref 13–16)
INR PPP: 1.1 (ref 0.8–1.2)
MCH RBC QN AUTO: 30.7 PG (ref 24–34)
MCHC RBC AUTO-ENTMCNC: 32.6 G/DL (ref 31–37)
MCV RBC AUTO: 94.2 FL (ref 74–97)
PLATELET # BLD AUTO: 307 K/UL (ref 135–420)
PMV BLD AUTO: 9.4 FL (ref 9.2–11.8)
POTASSIUM SERPL-SCNC: 3.1 MMOL/L (ref 3.5–5.5)
PROTHROMBIN TIME: 13.6 SEC (ref 11.5–15.2)
RBC # BLD AUTO: 4.33 M/UL (ref 4.7–5.5)
SODIUM SERPL-SCNC: 139 MMOL/L (ref 136–145)
WBC # BLD AUTO: 7 K/UL (ref 4.6–13.2)

## 2020-12-10 PROCEDURE — C1760 CLOSURE DEV, VASC: HCPCS | Performed by: SURGERY

## 2020-12-10 PROCEDURE — C1769 GUIDE WIRE: HCPCS | Performed by: SURGERY

## 2020-12-10 PROCEDURE — C1725 CATH, TRANSLUMIN NON-LASER: HCPCS | Performed by: SURGERY

## 2020-12-10 PROCEDURE — 77030004530 HC CATH ANGI DX IMGR BSC -A: Performed by: SURGERY

## 2020-12-10 PROCEDURE — 76937 US GUIDE VASCULAR ACCESS: CPT | Performed by: SURGERY

## 2020-12-10 PROCEDURE — 99152 MOD SED SAME PHYS/QHP 5/>YRS: CPT | Performed by: SURGERY

## 2020-12-10 PROCEDURE — 85027 COMPLETE CBC AUTOMATED: CPT

## 2020-12-10 PROCEDURE — 74011250636 HC RX REV CODE- 250/636: Performed by: SURGERY

## 2020-12-10 PROCEDURE — 99153 MOD SED SAME PHYS/QHP EA: CPT | Performed by: SURGERY

## 2020-12-10 PROCEDURE — 80048 BASIC METABOLIC PNL TOTAL CA: CPT

## 2020-12-10 PROCEDURE — C1894 INTRO/SHEATH, NON-LASER: HCPCS | Performed by: SURGERY

## 2020-12-10 PROCEDURE — 37228 HC PTA TIB/PER UNI INIT: CPT | Performed by: SURGERY

## 2020-12-10 PROCEDURE — 75710 ARTERY X-RAYS ARM/LEG: CPT | Performed by: SURGERY

## 2020-12-10 PROCEDURE — 74011000636 HC RX REV CODE- 636: Performed by: SURGERY

## 2020-12-10 PROCEDURE — 75625 CONTRAST EXAM ABDOMINL AORTA: CPT | Performed by: SURGERY

## 2020-12-10 PROCEDURE — 85730 THROMBOPLASTIN TIME PARTIAL: CPT

## 2020-12-10 PROCEDURE — C1887 CATHETER, GUIDING: HCPCS | Performed by: SURGERY

## 2020-12-10 PROCEDURE — 85610 PROTHROMBIN TIME: CPT

## 2020-12-10 PROCEDURE — 74011000250 HC RX REV CODE- 250: Performed by: SURGERY

## 2020-12-10 PROCEDURE — 77030013744: Performed by: SURGERY

## 2020-12-10 RX ORDER — HEPARIN SODIUM 1000 [USP'U]/ML
INJECTION, SOLUTION INTRAVENOUS; SUBCUTANEOUS AS NEEDED
Status: DISCONTINUED | OUTPATIENT
Start: 2020-12-10 | End: 2020-12-10 | Stop reason: HOSPADM

## 2020-12-10 RX ORDER — MIDAZOLAM HYDROCHLORIDE 1 MG/ML
INJECTION, SOLUTION INTRAMUSCULAR; INTRAVENOUS AS NEEDED
Status: DISCONTINUED | OUTPATIENT
Start: 2020-12-10 | End: 2020-12-10 | Stop reason: HOSPADM

## 2020-12-10 RX ORDER — CILOSTAZOL 100 MG/1
100 TABLET ORAL
Qty: 60 TAB | Refills: 3 | Status: SHIPPED | OUTPATIENT
Start: 2020-12-10

## 2020-12-10 RX ORDER — LIDOCAINE HYDROCHLORIDE 10 MG/ML
INJECTION, SOLUTION EPIDURAL; INFILTRATION; INTRACAUDAL; PERINEURAL AS NEEDED
Status: DISCONTINUED | OUTPATIENT
Start: 2020-12-10 | End: 2020-12-10 | Stop reason: HOSPADM

## 2020-12-10 RX ORDER — CILOSTAZOL 100 MG/1
100 TABLET ORAL
Qty: 60 TAB | Refills: 3 | Status: SHIPPED | OUTPATIENT
Start: 2020-12-10 | End: 2020-12-10 | Stop reason: CLARIF

## 2020-12-10 RX ORDER — SODIUM CHLORIDE 9 MG/ML
20 INJECTION, SOLUTION INTRAVENOUS CONTINUOUS
Status: DISCONTINUED | OUTPATIENT
Start: 2020-12-10 | End: 2020-12-10 | Stop reason: HOSPADM

## 2020-12-10 RX ORDER — FENTANYL CITRATE 50 UG/ML
INJECTION, SOLUTION INTRAMUSCULAR; INTRAVENOUS AS NEEDED
Status: DISCONTINUED | OUTPATIENT
Start: 2020-12-10 | End: 2020-12-10 | Stop reason: HOSPADM

## 2020-12-10 NOTE — Clinical Note
Peripheral Lesion 1. Lesion #1: Pressure = 9 mora; Duration = 90 sec. Lesion #2: Pressure = 9 mora; Duration = 90 sec. Lesion #3: Pressure = 9 mora; Duration = 90 sec. Lesion #4: Pressure = 9 mora; Duration = 90 sec.    Chocolate Balloon 2.5 x 120  Ref: [id number] w

## 2020-12-10 NOTE — Clinical Note
Vessel: right iliac, CFA, PFA and SFA. Hand injection to the artery. Single view taken.  3 cc hand inject

## 2020-12-10 NOTE — ROUTINE PROCESS
Cath holding summary 
  
Patient escorted to cath holding from waiting area ambulatory, alert and oriented x 4, voicing no complaints.  Changed into gown and placed on monitor.   NPO since MN.  Lab results, med rec and H&P reviewed on chart.  PIV x 1 inserted without difficulty.

## 2020-12-10 NOTE — Clinical Note
Sheath #1: Sheath: inserted. Sheath inserted/placed in the left femoral  artery.  4 Danish micro sheath

## 2020-12-10 NOTE — Clinical Note
Sheath #1: sheath exchanged for [facility] W/GDWIRE [id number] -- BRITE TIP - ORDER AS EA.

## 2020-12-10 NOTE — Clinical Note
Peripheral Lesion 1. Balloon balloon re-inflated. Lesion #1: Pressure = 9 mora; Duration = 90 sec.    Same balloon

## 2020-12-10 NOTE — Clinical Note
TRANSFER - IN REPORT:     Verbal report received from: [id number] [facility]. Report consisted of patient's Situation, Background, Assessment and   Recommendations(SBAR). Opportunity for questions and clarification was provided. Assessment completed upon patient's arrival to unit and care assumed. Patient transported with a Cardiac Cath Tech / Patient Care Tech.

## 2020-12-10 NOTE — Clinical Note
Vessel: right PTA, peroneal and MADY. Hand injection to the artery. Single view taken.  2.5 cc hand inject

## 2020-12-10 NOTE — Clinical Note
Vessel: right PFA, SFA and popliteal. Hand injection to the artery. Single view taken.  3 cc hand inject

## 2020-12-10 NOTE — TELEPHONE ENCOUNTER
Pharmacy was switched for Pletal to [facility] in [place] instead of [facility] per verbal order Dr. Kareem Vega.

## 2020-12-10 NOTE — PROGRESS NOTES
TRANSFER - IN REPORT:    Verbal report received from GALINDO Purdy(name) on [employer]  being received from Cath Lab(unit) for routine post - op      Report consisted of patients Situation, Background, Assessment and   Recommendations(SBAR). Information from the following report(s) SBAR, Procedure Summary, MAR and Recent Results was reviewed with the receiving nurse. Opportunity for questions and clarification was provided. Assessment completed upon patients arrival to unit and care assumed.

## 2020-12-10 NOTE — DISCHARGE INSTRUCTIONS
DISCHARGE SUMMARY from Nurse    PATIENT INSTRUCTIONS:    After general anesthesia or intravenous sedation, for 24 hours or while taking prescription Narcotics:  · Limit your activities  · Do not drive and operate hazardous machinery  · Do not make important personal or business decisions  · Do  not drink alcoholic beverages  · If you have not urinated within 8 hours after discharge, please contact your surgeon on call. Report the following to your surgeon:  · Excessive pain, swelling, redness or odor of or around the surgical area  · Temperature over 100.5  · Nausea and vomiting lasting longer than 4 hours or if unable to take medications  · Any signs of decreased circulation or nerve impairment to extremity: change in color, persistent  numbness, tingling, coldness or increase pain  · Any questions    What to do at Home:  Recommended activity: Activity as tolerated and no driving for today    *  Please give a list of your current medications to your Primary Care Provider. *  Please update this list whenever your medications are discontinued, doses are      changed, or new medications (including over-the-counter products) are added. *  Please carry medication information at all times in case of emergency situations. These are general instructions for a healthy lifestyle:    No smoking/ No tobacco products/ Avoid exposure to second hand smoke  Surgeon General's Warning:  Quitting smoking now greatly reduces serious risk to your health. Obesity, smoking, and sedentary lifestyle greatly increases your risk for illness    A healthy diet, regular physical exercise & weight monitoring are important for maintaining a healthy lifestyle    You may be retaining fluid if you have a history of heart failure or if you experience any of the following symptoms:  Weight gain of 3 pounds or more overnight or 5 pounds in a week, increased swelling in our hands or feet or shortness of breath while lying flat in bed. Please call your doctor as soon as you notice any of these symptoms; do not wait until your next office visit. The discharge information has been reviewed with the patient. The patient verbalized understanding. Discharge medications reviewed with the patient and appropriate educational materials and side effects teaching were provided. ___________________________________________________________________________________________________________________________________    PERIPHERAL ANGIOGRAPHY DISCHARGE INSTRUCTIONS    1. Check puncture site frequently for swelling or bleeding. If there is any bleeding, lie down and apply pressure over the area with a clean towel or washcloth. Notify your doctor for any redness, swelling, drainage, or oozing from the puncture site. Notify your doctor for any fever or chills. 2. If the extremity becomes cold, numb, or painful call [phone] for assistance. 3. Activity should be limited for the next 48 hours. Climb stairs as little as possible and avoid any stooping, bending, or strenuous activity for 48 hours. No heavy lifting (anything over 10 pounds) for 3 days. 4. You may resume your usual diet. Drink more fluids than usual.  5. Have a responsible person drive you home and stay with you for at least 24 hours after your heart catheterization/angiography. 6. You may remove bandage from your Right and Groin in 24 hours. You may shower in 24 hours. No tub baths, hot tubs, or swimming for 1 week. Do not place any lotions, creams, powders, or ointments over puncture site for 1 week. You may place a clean band-aid over the puncture site each day for 5 days. Change daily. Your Recovery  Your groin or leg may have a bruise or a small lump where the catheter was put in your groin. The area may feel sore for a day or two after the procedure. You can do light activities around the house but nothing strenuous for several days.   After surgery, blood may flow better throughout your leg, which can decrease leg pain, numbness, and cramping. This care sheet gives you a general idea about how long it will take for you to recover. But each person recovers at a different pace. Follow the steps below to feel better as quickly as possible. How can you care for yourself at home? Activity  · Do not do strenuous exercise and do not lift anything heavy until your doctor says it is okay. This may be for a day or two. You can walk around the house and do light activity, such as cooking. · You may shower 24 to 48 hours after the procedure, if your doctor okays it. Pat the incision dry. Do not take a bath for 1 week, or until your doctor tells you it is okay. · Go back to regular exercise when your doctor says it is okay. Walking is a good choice. · If you work, you will probably need to take 1 or 2 days off. It depends on the type of work you do and how you feel. Diet  · You can eat your normal diet. · Drink plenty of fluids (unless your doctor tells you not to). Medicines  · Your doctor will tell you if and when you can restart your medicines. He or she will also give you instructions about taking any new medicines. · If you take blood thinners, such as warfarin (Coumadin), clopidogrel (Plavix), or aspirin, be sure to talk to your doctor. He or she will tell you if and when to start taking those medicines again. Make sure that you understand exactly what your doctor wants you to do. · Be safe with medicines. Take your medicines exactly as prescribed. Call your doctor if you think you are having a problem with your medicine. · Your doctor may prescribe a blood thinner when you go home. This helps prevent blood clots. Be sure you get instructions about how to take your medicine safely. Blood thinners can cause serious bleeding problems. Care of the catheter site  · Keep a bandage over the spot where the catheter was inserted for the first day, or for as long as your doctor recommends.   · Put ice or a cold pack on the area for 10 to 20 minutes at a time to help with soreness or swelling. Do this every few hours. Put a thin cloth between the ice and your skin. Follow-up care is a key part of your treatment and safety. Be sure to make and go to all appointments, and call your doctor if you are having problems. It's also a good idea to know your test results and keep a list of the medicines you take. Sedation for a Medical Procedure: Care Instructions  Your Care Instructions  For a minor procedure or surgery, you will get a sedative to help you relax. This drug will make you sleepy. It is usually given in a vein (by IV). A shot may also be used to numb the area. If you had local anesthesia, you may feel some pain and discomfort as it wears off. If you have pain, don't be afraid to say so. Pain medicine works better if you take it before the pain gets bad. Common side effects from sedation include:  · Feeling sleepy. (Your doctors and nurses will make sure you are not too sleepy to go home.)  · Nausea and vomiting. This usually does not last long. · Feeling tired. Follow-up care is a key part of your treatment and safety. Be sure to make and go to all appointments, and call your doctor if you are having problems. It's also a good idea to know your test results and keep a list of the medicines you take. How can you care for yourself at home? Activity  · Don't do anything for 24 hours that requires attention to detail. It takes time for the medicine effects to completely wear off. · For your safety, you should not drive or operate any machinery that could be dangerous until the medicine wears off and you can think clearly and react easily. · Rest when you feel tired. Getting enough sleep will help you recover. Diet  · You can eat your normal diet, unless your doctor gives you other instructions. If your stomach is upset, try clear liquids and bland, low-fat foods like plain toast or rice.   · Drink plenty of fluids (unless your doctor tells you not to). · Don't drink alcohol for 24 hours. Medicines  · Be safe with medicines. Read and follow all instructions on the label. ¨ If the doctor gave you a prescription medicine for pain, take it as prescribed. ¨ If you are not taking a prescription pain medicine, ask your doctor if you can take an over-the-counter medicine. · If you think your pain medicine is making you sick to your stomach:  ¨ Take your medicine after meals (unless your doctor has told you not to). ¨ Ask your doctor for a different pain medicine. When should you call for help? Call 911 anytime you think you may need emergency care. For example, call if:  · You passed out (lost consciousness). · You have severe trouble breathing. · You have sudden chest pain and shortness of breath, or you cough up blood. · Your groin is very swollen and you have a lump that is getting bigger under your skin where the catheter was put in. Call your doctor now or seek immediate medical care if:  · You have severe pain in your leg, or it becomes cold, pale, blue, tingly, or numb. · You are bleeding from the area where the catheter was put in your artery. · You have a fast-growing, painful lump at the catheter site. · You have signs of infection, such as:  ¨ Increased pain, swelling, warmth, or redness of the skin. ¨ Red streaks leading from the area. ¨ Pus draining from the area. ¨ A fever. These are general instructions for a healthy lifestyle:    No smoking/ No tobacco products/ Avoid exposure to second hand smoke    Surgeon General's Warning:  Quitting smoking now greatly reduces serious risk to your health.     Obesity, smoking, and sedentary lifestyle greatly increases your risk for illness    A healthy diet, regular physical exercise & weight monitoring are important for maintaining a healthy lifestyle    You may be retaining fluid if you have a history of heart failure or if you experience any of the following symptoms:  Weight gain of 3 pounds or more overnight or 5 pounds in a week, increased swelling in our hands or feet or shortness of breath while lying flat in bed. Please call your doctor as soon as you notice any of these symptoms; do not wait until your next office visit. Recognize signs and symptoms of STROKE:    F-face looks uneven    A-arms unable to move or move unevenly    S-speech slurred or non-existent    T-time-call 911 as soon as signs and symptoms begin-DO NOT go       Back to bed or wait to see if you get better-TIME IS BRAIN. Warning Signs of HEART ATTACK     Call 911 if you have these symptoms:   Chest discomfort. Most heart attacks involve discomfort in the center of the chest that lasts more than a few minutes, or that goes away and comes back. It can feel like uncomfortable pressure, squeezing, fullness, or pain.  Discomfort in other areas of the upper body. Symptoms can include pain or discomfort in one or both arms, the back, neck, jaw, or stomach.  Shortness of breath with or without chest discomfort.  Other signs may include breaking out in a cold sweat, nausea, or lightheadedness. Don't wait more than five minutes to call 911 - MINUTES MATTER! Fast action can save your life. Calling 911 is almost always the fastest way to get lifesaving treatment. Emergency Medical Services staff can begin treatment when they arrive -- up to an hour sooner than if someone gets to the hospital by car. The discharge information has been reviewed with the patient. The patient verbalized understanding. Discharge medications reviewed with the patient and appropriate educational materials and side effects teaching were provided.

## 2020-12-10 NOTE — INTERVAL H&P NOTE
Update History & Physical 
 
The Patient's History and Physical of November 11, 2020 was reviewed with the patient and I examined the patient. The patient now believes it is his right leg that is bothering him. We will prepare for a bilateral LE angiogram with plans to focus intervention on the right. The surgical site was confirmed by the patient and me. Plan:  The risk, benefits, expected outcome, and alternative to the recommended procedure have been discussed with the patient. Patient understands and wants to proceed with the procedure.  
 
Electronically signed by Chacha Boss MD on 12/10/2020 at 1:10 PM

## 2020-12-10 NOTE — Clinical Note
Vessel: right PTA, peroneal and MADY. Hand injection to the artery. Single view taken.  3 cc hand inject

## 2020-12-10 NOTE — Clinical Note
Contrast Dose Calculator:   Patient's age: 63.   Patient's sex: Male. Patient weight (kg) = 83.9. Creatinine level (mg/dL) = 1.51. Creatinine clearance (mL/min): 59. Contrast concentration (mg/mL) = 300. MACD = 277.81 mL. Max Contrast dose per Creatinine Cl calculator = 132.75 mL.

## 2020-12-10 NOTE — Clinical Note
Vessel: right popliteal, PTA, peroneal and MADY. Hand injection to the artery. Single view taken.  3 cc hand inject

## 2020-12-10 NOTE — Clinical Note
TRANSFER - OUT REPORT:     Verbal report given to: Michelle MARTINEZ. Report consisted of patient's Situation, Background, Assessment and   Recommendations(SBAR). Opportunity for questions and clarification was provided. Patient transported with a Cardiac Cath Tech / Patient Care Tech. Patient transported to: [address]

## 2020-12-10 NOTE — TELEPHONE ENCOUNTER
New prescription for Pletal 100 mg tablet, take one tablet by mouth twice daily, quantity 60 with 3 refills sent to [address] per verbal order Dr. Michael Vinson.

## 2020-12-10 NOTE — Clinical Note
Vessel: right iliac, CFA, PFA and SFA. Hand injection to the artery. Single view taken.  5 cc hand inject

## 2020-12-10 NOTE — PROGRESS NOTES
TRANSFER - OUT REPORT:    Verbal report given to GALINDO Greenfield(name) on [employer]  being transferred to cath lab(unit) for ordered procedure       Report consisted of patients Situation, Background, Assessment and   Recommendations(SBAR). Information from the following report(s) SBAR, Intake/Output, MAR, Accordion, Recent Results, Med Rec Status, Pre Procedure Checklist and Procedure Verification was reviewed with the receiving nurse. Lines:       Opportunity for questions and clarification was provided.       Patient transported with:   [employer]

## 2020-12-10 NOTE — Clinical Note
Sheath #1: sheath exchanged for INTR[facility] W/PRAKASHWIRE [id number] -- BRITE TIP - ORDER AS EA.

## 2020-12-10 NOTE — Clinical Note
Vessel: right MADY and dorsalis pedis. Hand injection to the artery. Single view taken.  3 cc hand inject

## 2020-12-11 DIAGNOSIS — I70.213 ATHEROSCLEROSIS OF NATIVE ARTERY OF BOTH LOWER EXTREMITIES WITH INTERMITTENT CLAUDICATION (HCC): ICD-10-CM

## 2020-12-11 DIAGNOSIS — Z72.0 TOBACCO ABUSE: ICD-10-CM

## 2020-12-18 ENCOUNTER — TELEPHONE (OUTPATIENT)
Dept: VASCULAR SURGERY | Age: 65
End: 2020-12-18

## 2020-12-18 NOTE — TELEPHONE ENCOUNTER
Called and spoke with patient at this time; attempted to reschedule patient; patient advises to call Sabrina Eason ( verified on [employer] list) to rescheduled appt as she is his transportation; attempted to contact Sabrina Eason; no answer noted at number given; vm left to contact office.

## 2024-05-14 NOTE — Clinical Note
Catheter removed. Orientation to room/Bed in low position, brakes on/Side rails x 2 or 4 up, assess large gaps, such that a patient could get extremity or other body part entrapped, use additional safety procedures/Use of non-skid footwear for ambulating patients, use of appropriate size clothing to prevent risk of tripping/Assess eliminations need, assist as needed/Call light is within reach, educate patient/family on its functionality/Environment clear of unused equipment, furniture's in place, clear of hazards/Assess for adequate lighting, leave nightlight on

## (undated) DEVICE — VIPERWIRE, ADVANCE PERIPHERAL, .017" DIA SPRING TIP, 335CM, 5 PACK: Brand: VIPERWIRE, ADVANCE

## (undated) DEVICE — CATHETER ANGIO AD PED 4FR L65CM GWIRE 0.035IN PERIPH

## (undated) DEVICE — COVER US PRB W15XL120CM W/ GEL RUBBERBAND TAPE STRP FLD GEN

## (undated) DEVICE — SNARE ENDO 2.4MMX230CM -- COLD EXACTO

## (undated) DEVICE — DEVICE VASC CLSR 6FR W/ TWO VIS INDIC FOR FEM ART PUNC

## (undated) DEVICE — RADIFOCUS GLIDEWIRE ADVANTAGE GUIDEWIRE: Brand: GLIDEWIRE ADVANTAGE

## (undated) DEVICE — CONTAINER PREFIL FRMLN 15ML --

## (undated) DEVICE — Device: Brand: QUICK-CROSS SUPPORT CATHETER

## (undated) DEVICE — Device

## (undated) DEVICE — INTRODUCER SHTH 4FR CANN L11CM DIL TIP 25MM RED TUNGSTEN

## (undated) DEVICE — MEDI-VAC NON-CONDUCTIVE SUCTION TUBING: Brand: CARDINAL HEALTH

## (undated) DEVICE — KIT MIC INTR PERC 4F 60CM SMTH -- VSI

## (undated) DEVICE — INTRODUCER SHTH 6FR CANN L11CM DIL TIP 35MM GRN TUNGSTEN

## (undated) DEVICE — LUB GUID WR CARDIC CATH 100ML -- VIPERSLIDE

## (undated) DEVICE — HI-TORQUE SPARTACORE 14 PERIPHERAL GUIDE WIRE .014 5.0 CM X 300 CM: Brand: SPARTACORE

## (undated) DEVICE — FLEXOR, CHECK-FLO, INTRODUCER ANSEL MODIFICATION - WITH HIGH-FLEX DILATOR AND HYDROPHILIC COATING: Brand: FLEXOR

## (undated) DEVICE — PTA BALLOON DILATATION CATHETER: Brand: COYOTE™

## (undated) DEVICE — RADIFOCUS GLIDECATH: Brand: GLIDECATH

## (undated) DEVICE — SET FLD ADMIN 3 W STPCOCK FIX FEM L BOR 1IN

## (undated) DEVICE — TRAP SPEC COLL POLYP POLYSTYR --

## (undated) DEVICE — KENDALL 500 SERIES DIAPHORETIC FOAM MONITORING ELECTRODE - TEAR DROP SHAPE ( 30/PK): Brand: KENDALL

## (undated) DEVICE — ANGIOGRAPHY KIT CUST VASC

## (undated) DEVICE — DESTINATION PERIPHERAL GUIDING SHEATH: Brand: DESTINATION

## (undated) DEVICE — FLEX ADVANTAGE 1500CC: Brand: FLEX ADVANTAGE

## (undated) DEVICE — KIT COLON W/ 1.1OZ LUB AND 2 END

## (undated) DEVICE — SYR 50ML SLIP TIP NSAF LF STRL --

## (undated) DEVICE — PACK PROCEDURE SURG VASC CATH 161 MMC LF

## (undated) DEVICE — FORCEPS BX L240CM JAW DIA2.8MM L CAP W/ NDL MIC MESH TOOTH

## (undated) DEVICE — CATH ATHRCTMY SOLID-CRN 1.25MM -- DIAMONDBACK 360

## (undated) DEVICE — SYR ART 700 CLEAR MARK 7 -- ARTERION

## (undated) DEVICE — DEVICE INFL L13IN 40ATM 30ML BASIXTOUCH

## (undated) DEVICE — CATH SUPP SNGL 0.035INX135CM -- TRAILBLAZER - ORDER BY PK/5

## (undated) DEVICE — COPILOT BLEEDBACK CONTROL VALVE: Brand: COPILOT

## (undated) DEVICE — BASIN EMESIS 500CC ROSE 250/CS 60/PLT: Brand: MEDEGEN MEDICAL PRODUCTS, LLC